# Patient Record
Sex: FEMALE | Race: WHITE | NOT HISPANIC OR LATINO | Employment: UNEMPLOYED | URBAN - METROPOLITAN AREA
[De-identification: names, ages, dates, MRNs, and addresses within clinical notes are randomized per-mention and may not be internally consistent; named-entity substitution may affect disease eponyms.]

---

## 2017-10-17 ENCOUNTER — OFFICE VISIT (OUTPATIENT)
Dept: OBGYN | Facility: CLINIC | Age: 32
End: 2017-10-17
Attending: MIDWIFE
Payer: COMMERCIAL

## 2017-10-17 VITALS
HEART RATE: 89 BPM | WEIGHT: 217.9 LBS | BODY MASS INDEX: 35.02 KG/M2 | HEIGHT: 66 IN | DIASTOLIC BLOOD PRESSURE: 77 MMHG | SYSTOLIC BLOOD PRESSURE: 109 MMHG

## 2017-10-17 DIAGNOSIS — Z00.00 VISIT FOR PREVENTIVE HEALTH EXAMINATION: Primary | ICD-10-CM

## 2017-10-17 PROCEDURE — 99212 OFFICE O/P EST SF 10 MIN: CPT | Mod: ZF

## 2017-10-17 RX ORDER — LORATADINE 10 MG/1
10 TABLET ORAL DAILY
COMMUNITY

## 2017-10-17 NOTE — PATIENT INSTRUCTIONS

## 2017-10-17 NOTE — MR AVS SNAPSHOT
After Visit Summary   10/17/2017    Marilyn Mi    MRN: [id number]           Patient Information     Date Of Birth          1985        Visit Information        Provider Department      10/17/2017 3:30 PM Snehal Garza APRN CNM [facility]        Today's Diagnoses     Visit for preventive health examination    -  1      Care Instructions      PREVENTIVE HEALTH RECOMMENDATIONS:   Most women need a yearly breast and pelvic exam.    A PAP screen, a test done DURING a pelvic exam, is NO longer recommended yearly.    March 2013, screening guidelines recommended by ACOG for PAP screen are:    1) First pap at age 21.    2) Pap every 3 years until age 30.    3) After age 30, pap every 3 years or Pap with HR HPV screen every 5 years until age 65.  4) Women do NOT need a vaginal Pap screen after a hysterectomy (surgical removal of the uterus) when they have not had cancer.    Exceptions:  1) Yearly pap if HIV+ or immunosuppressed secondary to organ transplant  2) STEPHANIE II-III continue routine screening for 20 years.    I encourage you continue looking for opportunities to choose a healthy lifestyle:       * Choose to eat a heart healthy diet. Check out the FOOD PLATE guidelines at: http://www.choosemyplate.gov/ for helpful hints on weight and cholesterol management.  Balance your caloric intake with exercise to maintain a BMI in the 22 to 26 range. For bone health: Eat calcium-rich foods like yogurt, broccoli or take chewable calcium pills (500 to 600 mg) twice a day with food.       * Exercise for at least an average of 30 minutes a day, 5 days of the week. This will help you control your weight, release stress, and help prevent disease.      * Take a Vitamin D3 supplement daily fall through spring and during summer unless you [id number]' full body sun exposure to skin without sunscreen.      * DO wear sunscreen to prevent skin cancer after the first 15-30 minutes.      * Identify  stressors in your life, find ways to release the stress, and, make time for yourself. PLEASE ask for help if mood changes last longer than two weeks.     * Limit alcohol to one drink per day.  No smoking.  Avoid second hand smoke. If you smoke, ask for help to stop.       *  If you are in a sexual relationship, talk with your partner about possible infection risks and take action to protect yourself from exposure to a sexual infection.    Please request an infection screen for STIs (sexually transmitted infections) if you are less than age 26 OR believe that you may be at risk.     Get a flu shot each year. Get a tetanus shot every 10 years. EVERYONE needs a pertussis (Whooping cough) booster.    See your dentist twice a year for an exam and preventive care cleaning.     Consider the following screen tests:    1) cholesterol test every 5 years.     2) yearly mammogram after age 40 unless you have identified risks.    3) colonoscopy every 10 years after age 50 unless you have identified risks.    4) diabetes blood test screening if you are at risk for diabetes.      Additional information that you may also find helpful:  The Internet now gives us access to LOTS of information -- some of it helpful, research documented and also plenty of harmful, anecdotal information that may not pertain to your situtaion. Consider visiting the following websites for accurate health information:    www.vitamindcouncil.org/ : Info and ongoing research re Vitamin D    www.fairview.org : Up to date and easily searchable information on multiple topics.    www.medlineplus.gov : medication info, interactive tutorials, watch real surgeries online    www.cdc.gov : public health info, travel advisories, epidemics (H1N1)    www.dandy/std.org: current research re diagnosis, treatment and prevention of sexually contacted infections.    www.health.state.[place] : [place] dept of heatl, public health issues in [place], N1N1    www.familydoctor.org : good info  from the Academy of Family Physicians        PREVENTIVE HEALTH RECOMMENDATIONS:   Most women need a yearly breast and pelvic exam.    A PAP screen, a test done DURING a pelvic exam, is NO longer recommended yearly.    March 2013, screening guidelines recommended by ACOG for PAP screen are:    1) First pap at age 21.    2) Pap every 3 years until age 30.    3) After age 30, pap every 3 years or Pap with HR HPV screen every 5 years until age 65.  4) Women do NOT need a vaginal Pap screen after a hysterectomy (surgical removal of the uterus) when they have not had cancer.    Exceptions:  1) Yearly pap if HIV+ or immunosuppressed secondary to organ transplant  2) STEPHANIE II-III continue routine screening for 20 years.    I encourage you continue looking for opportunities to choose a healthy lifestyle:       * Choose to eat a heart healthy diet. Check out the FOOD PLATE guidelines at: http://www.choosemyplate.gov/ for helpful hints on weight and cholesterol management.  Balance your caloric intake with exercise to maintain a BMI in the 22 to 26 range. For bone health: Eat calcium-rich foods like yogurt, broccoli or take chewable calcium pills (500 to 600 mg) twice a day with food.       * Exercise for at least an average of 30 minutes a day, 5 days of the week. This will help you control your weight, release stress, and help prevent disease.      * Take a Vitamin D3 supplement daily fall through spring and during summer unless you wfcw27-17' full body sun exposure to skin without sunscreen.      * DO wear sunscreen to prevent skin cancer after the first 15-30 minutes.      * Identify stressors in your life, find ways to release the stress, and, make time for yourself. PLEASE ask for help if mood changes last longer than two weeks.     * Limit alcohol to one drink per day.  No smoking.  Avoid second hand smoke. If you smoke, ask for help to stop.       *  If you are in a sexual relationship, talk with your partner about  possible infection risks and take action to protect yourself from exposure to a sexual infection.    Please request an infection screen for STIs (sexually transmitted infections) if you are less than age 26 OR believe that you may be at risk.     Get a flu shot each year. Get a tetanus shot every 10 years. EVERYONE needs a pertussis (Whooping cough) booster.    See your dentist twice a year for an exam and preventive care cleaning.     Consider the following screen tests:    1) cholesterol test every 5 years.     2) yearly mammogram after age 40 unless you have identified risks.    3) colonoscopy every 10 years after age 50 unless you have identified risks.    4) diabetes blood test screening if you are at risk for diabetes.      Additional information that you may also find helpful:  The Internet now gives us access to LOTS of information -- some of it helpful, research documented and also plenty of harmful, anecdotal information that may not pertain to your situtaion. Consider visiting the following websites for accurate health information:    www.vitamindcouncil.org/ : Info and ongoing research re Vitamin D    www.fairview.org : Up to date and easily searchable information on multiple topics.    www.medlineplus.gov : medication info, interactive tutorials, watch real surgeries online    www.cdc.gov : public health info, travel advisories, epidemics (H1N1)    www.dandy/std.org: current research re diagnosis, treatment and prevention of sexually contacted infections.    www.health.state.[place] : [place] dept of heatl, public health issues in [place], N1N1    www.familydoctor.org : good info from the Academy of Family Physicians                Follow-ups after your visit        Follow-up notes from your care team     Return in 1 year (on 10/17/2018) for Preventative Health Visit.      Who to contact     Please call your clinic at [phone] to:    Ask questions about your health    Make or cancel appointments    Discuss your  "medicines    Learn about your test results    Speak to your doctor   If you have compliments or concerns about an experience at your clinic, or if you wish to file a complaint, please contact [facility] Patient Relations at [phone] or email us at [email or web address]         Additional Information About Your Visit        [email or web address] Information     iProcuret is an electronic gateway that provides easy, online access to your medical records. With Beezik, you can request a clinic appointment, read your test results, renew a prescription or communicate with your care team.     To sign up for Beezik visit the website at [email or web address]   You will be asked to enter the access code listed below, as well as some personal information. Please follow the directions to create your username and password.     Your access code is: [email or web address]  Expires: 2018  6:31 AM     Your access code will  in 90 days. If you need help or a new code, please contact your [facility] or call [phone] for assistance.        Care EveryWhere ID     This is your Care EveryWhere ID. This could be used by other organizations to access your [place] medical records  [id number]        Your Vitals Were     Pulse Height BMI (Body Mass Index)             89 1.676 m (5' 6\") 35.17 kg/m2          Blood Pressure from Last 3 Encounters:   10/17/17 109/77   12 120/85   11 98/71    Weight from Last 3 Encounters:   10/17/17 98.8 kg (217 lb 14.4 oz)   11 89.3 kg (196 lb 12.8 oz)   11 77.1 kg (170 lb)              Today, you had the following     No orders found for display       Primary Care Provider    Physician No Ref-Primary       NO REF-PRIMARY PHYSICIAN        Equal Access to Services     ALEXA RAMIREZ : Hadii annabel Oconnor, waaxda luqadaha, qaybta kaalmada kaz, jyoti harris. So wac " [phone].    ATENCIÓN: Si ophelia jacobs, tiene a lynne disposición servicios gratuitos de asistencia lingüística. Kyree jimenez [phone].    We comply with applicable federal civil rights laws and [place] laws. We do not discriminate on the basis of race, color, national origin, age, disability, sex, sexual orientation, or gender identity.            Thank you!     Thank you for choosing [facility]  for your care. Our goal is always to provide you with excellent care. Hearing back from our patients is one way we can continue to improve our services. Please take a few minutes to complete the written survey that you may receive in the mail after your visit with us. Thank you!             Your Updated Medication List - Protect others around you: Learn how to safely use, store and throw away your medicines at www.disposemymeds.org.          This list is accurate as of: 10/17/17  7:00 PM.  Always use your most recent med list.                   Brand Name Dispense Instructions for use Diagnosis    ibuprofen 600 MG tablet    ADVIL/MOTRIN    20 tablet    Take 1 tablet by mouth every 8 hours as needed for pain.        loratadine 10 MG tablet    CLARITIN     Take 10 mg by mouth daily        VITAMIN D PO      Take 1 tablet by mouth daily.

## 2017-10-17 NOTE — PROGRESS NOTES
"    ROS      Progress Note    SUBJECTIVE:  Marilyn Mi is an 31 year old, , who requests an Annual Preventive Exam.       Concerns today include: pt is here for annual health check .  She has been under increased stress with frequent job relocations,hoping things will settle down with new move into housing.  Had been \"couch surfing \" with frequent moves.  Noting increased anxiety  Not interested in meds or therapy referral.  Will work on work life balance, get settled, plans to try lactase thinks some stomach upset due to lactose interance.      Menstrual History:  No flowsheet data found.    Last  No results found for: PAP  History of abnormal Pap smear: Status post benign hysterectomy. Health Maintenance and Surgical History updated.    Last No results found for: HPV16  Last No results found for: HPV18  Last No results found for: HRHPV    Mammogram current: not applicable  Last Mammogram:   No results found.     Last Colonoscopy:  No results found for this or any previous visit.      HISTORY:    Current Outpatient Prescriptions on File Prior to Visit:  Cholecalciferol (VITAMIN D PO) Take 1 tablet by mouth daily.   ibuprofen (ADVIL,MOTRIN) 600 MG tablet Take 1 tablet by mouth every 8 hours as needed for pain. (Patient not taking: Reported on 10/17/2017)     No current facility-administered medications on file prior to visit.   No Known Allergies    There is no immunization history on file for this patient.    Obstetric History       T0      L0     SAB0   TAB0   Ectopic0   Multiple0   Live Births0      Past Medical History:   Diagnosis Date     NO ACTIVE PROBLEMS      Ovarian cyst     age 13      Past Surgical History:   Procedure Laterality Date     GYN SURGERY      ovarian cyst/22 lbs - at age 13 yrs     HYSTERECTOMY      with above age 13     Family History   Problem Relation Age of Onset     Depression Mother      Alcoholism Mother      recovery      Endocrine Disease Father      " "hypothyroidsim     CANCER Maternal Grandmother      Colon/liver CA     DIABETES Paternal Grandfather      Endocrine Disease Brother      hyporthyroidism     Social History     Social History     Marital status:      Spouse name: N/A     Number of children: N/A     Years of education: N/A     Occupational History      - Finance      Social History Main Topics     Smoking status: Never Smoker     Smokeless tobacco: Never Used     Alcohol use 1.0 - 1.5 oz/week     2 - 3 drink(s) per week     Drug use: No     Sexual activity: Yes     Partners: Male     Birth control/ protection: None     Other Topics Concern      Service No     Blood Transfusions Yes     Caffeine Concern Yes     2 and Yerba Mate     Occupational Exposure No     Hobby Hazards Yes     Rock climbing, use proper gear     Sleep Concern Yes     sleeping through the night; To get drink of water     Stress Concern Yes     Weight Concern Yes     Special Diet No     Back Care No     Exercise No     Bike Helmet Yes     Seat Belt Yes     Social History Narrative    How much exercise per week? biking    How much calcium per day? none       How much caffeine per day? tea     How much vitamin D per day? none    Do you/your family wear seatbelts?  Yes    Do you/your family use safety helmets? Yes    Do you/your family use sunscreen? Yes    Do you/your family keep firearms in the home? No    Do you/your family have a smoke detector(s)? Yes        Edwar EISENBERG CMA 10/17/2017       ROS  [unfilled]  No flowsheet data found.    EXAM:  Blood pressure 109/77, pulse 89, height 1.676 m (5' 6\"), weight 98.8 kg (217 lb 14.4 oz). Body mass index is 35.17 kg/(m^2).  General - pleasant female in no acute distress.  Skin - no suspicious lesions or rashes  EENT-  PERRLA, euthyroid with out palpable nodules  Neck - supple without lymphadenopathy.  Lungs - clear to auscultation bilaterally.  Heart - regular rate and rhythm without murmur.  Abdomen - soft, " nontender, nondistended, no masses or organomegaly noted.  Musculoskeletal - no gross deformities.  Neurological - normal strength, sensation, and mental status.    Breast Exam:  Breast: Without visible skin changes. No dimpling or lesions seen.   Breasts supple, non-tender with palpation, no dominant mass, nodularity, or nipple discharge noted bilaterally. Axillary nodes negative.      Pelvic Exam:  EG/BUS: Normal genital architecture without lesions, erythema or abnormal secretions Bartholin's, Urethra, Stephen's normal   Urethral meatus: normal   Urethra: no masses, tenderness, or scarring   Bladder: no masses or tenderness   Vagina: moist, pink, rugae with creamy, white and odorless  secretions  Cervix: surgically absent  Uterus: surgically absent  Adnexa: No masses, nodularity, tenderness and pt is unsure if they left one ovary  One is Surgically absent  Rectum:anus normal       ASSESSMENT:  Encounter Diagnosis   Name Primary?     Visit for preventive health examination Yes        PLAN:   No orders of the defined types were placed in this encounter.    Orders Placed This Encounter   Medications     loratadine (CLARITIN) 10 MG tablet     Sig: Take 10 mg by mouth daily     Unable to locate surgical  Records from hysterectomy  requested at prior visit.  Discussed sending another request pt left without completing   Additional teaching done at this visit regarding stress reduction work life balance.    Referral prn to [facility] therapist  Offered H.Pylori stool test if decides  Declined lipids, TSH, vit D. hgb today   Declined flu shot     Return to clinic in one year.  Follow-up as needed.   Yola Garza CNM APRN

## 2017-10-17 NOTE — LETTER
"10/17/2017       RE: Marilyn Mi  [address]  [address]  [facility] [address]     Dear Colleague,    Thank you for referring your patient, Marilyn Mi, to the [facility] Please see a copy of my visit note below.        ROS      Progress Note    SUBJECTIVE:  Marilyn Mi is an 31 year old, , who requests an Annual Preventive Exam.       Concerns today include: pt is here for annual health check .  She has been under increased stress with frequent job relocations,hoping things will settle down with new move into housing.  Had been \"couch surfing \" with frequent moves.  Noting increased anxiety  Not interested in meds or therapy referral.  Will work on work life balance, get settled, plans to try lactase thinks some stomach upset due to lactose interance.      Menstrual History:  No flowsheet data found.    Last  No results found for: PAP  History of abnormal Pap smear: Status post benign hysterectomy. Health Maintenance and Surgical History updated.    Last No results found for: HPV16  Last No results found for: HPV18  Last No results found for: HRHPV    Mammogram current: not applicable  Last Mammogram:   No results found.     Last Colonoscopy:  No results found for this or any previous visit.      HISTORY:    Current Outpatient Prescriptions on File Prior to Visit:  Cholecalciferol (VITAMIN D PO) Take 1 tablet by mouth daily.   ibuprofen (ADVIL,MOTRIN) 600 MG tablet Take 1 tablet by mouth every 8 hours as needed for pain. (Patient not taking: Reported on 10/17/2017)     No current facility-administered medications on file prior to visit.   No Known Allergies    There is no immunization history on file for this patient.    Obstetric History       T0      L0     SAB0   TAB0   Ectopic0   Multiple0   Live Births0      Past Medical History:   Diagnosis Date     NO ACTIVE PROBLEMS      Ovarian cyst     age 13  " "    Past Surgical History:   Procedure Laterality Date     GYN SURGERY  1999    ovarian cyst/22 lbs - at age 13 yrs     HYSTERECTOMY      with above age 13     Family History   Problem Relation Age of Onset     Depression Mother      Alcoholism Mother      recovery      Endocrine Disease Father      hypothyroidsim     CANCER Maternal Grandmother      Colon/liver CA     DIABETES Paternal Grandfather      Endocrine Disease Brother      hyporthyroidism     Social History     Social History     Marital status:      Spouse name: N/A     Number of children: N/A     Years of education: N/A     Occupational History      - Finance      Social History Main Topics     Smoking status: Never Smoker     Smokeless tobacco: Never Used     Alcohol use 1.0 - 1.5 oz/week     2 - 3 drink(s) per week     Drug use: No     Sexual activity: Yes     Partners: Male     Birth control/ protection: None     Other Topics Concern      Service No     Blood Transfusions Yes     Caffeine Concern Yes     2 and Yerba Mate     Occupational Exposure No     Hobby Hazards Yes     Rock climbing, use proper gear     Sleep Concern Yes     sleeping through the night; To get drink of water     Stress Concern Yes     Weight Concern Yes     Special Diet No     Back Care No     Exercise No     Bike Helmet Yes     Seat Belt Yes     Social History Narrative    How much exercise per week? biking    How much calcium per day? none       How much caffeine per day? tea     How much vitamin D per day? none    Do you/your family wear seatbelts?  Yes    Do you/your family use safety helmets? Yes    Do you/your family use sunscreen? Yes    Do you/your family keep firearms in the home? No    Do you/your family have a smoke detector(s)? Yes        Edwar EISENBERG CMA 10/17/2017       ROS  [unfilled]  No flowsheet data found.    EXAM:  Blood pressure 109/77, pulse 89, height 1.676 m (5' 6\"), weight 98.8 kg (217 lb 14.4 oz). Body mass index is 35.17 " kg/(m^2).  General - pleasant female in no acute distress.  Skin - no suspicious lesions or rashes  EENT-  PERRLA, euthyroid with out palpable nodules  Neck - supple without lymphadenopathy.  Lungs - clear to auscultation bilaterally.  Heart - regular rate and rhythm without murmur.  Abdomen - soft, nontender, nondistended, no masses or organomegaly noted.  Musculoskeletal - no gross deformities.  Neurological - normal strength, sensation, and mental status.    Breast Exam:  Breast: Without visible skin changes. No dimpling or lesions seen.   Breasts supple, non-tender with palpation, no dominant mass, nodularity, or nipple discharge noted bilaterally. Axillary nodes negative.      Pelvic Exam:  EG/BUS: Normal genital architecture without lesions, erythema or abnormal secretions Bartholin's, Urethra, Lubeck's normal   Urethral meatus: normal   Urethra: no masses, tenderness, or scarring   Bladder: no masses or tenderness   Vagina: moist, pink, rugae with creamy, white and odorless  secretions  Cervix: surgically absent  Uterus: surgically absent  Adnexa: No masses, nodularity, tenderness and pt is unsure if they left one ovary  One is Surgically absent  Rectum:anus normal       ASSESSMENT:  Encounter Diagnosis   Name Primary?     Visit for preventive health examination Yes        PLAN:   No orders of the defined types were placed in this encounter.    Orders Placed This Encounter   Medications     loratadine (CLARITIN) 10 MG tablet     Sig: Take 10 mg by mouth daily     Unable to locate surgical  Records from hysterectomy  requested at prior visit.  Discussed sending another request pt left without completing   Additional teaching done at this visit regarding stress reduction work life balance.    Referral prn to [facility] therapist  Offered H.Pylori stool test if decides  Declined lipids, TSH, vit D. hgb today   Declined flu shot     Return to clinic in one year.  Follow-up as needed.   Yola Garza CNM APRN          Again, thank you for allowing me to participate in the care of your patient.      Sincerely,    GUCCI Alex CNM

## 2017-10-17 NOTE — LETTER
Date:October 19, 2017      Patient was self referred, no letter generated. Do not send.        [facility] Health Information

## 2023-09-25 ENCOUNTER — OFFICE VISIT (OUTPATIENT)
Dept: URGENT CARE | Facility: URGENT CARE | Age: 38
End: 2023-09-25
Payer: COMMERCIAL

## 2023-09-25 VITALS
SYSTOLIC BLOOD PRESSURE: 124 MMHG | HEART RATE: 82 BPM | WEIGHT: 227 LBS | BODY MASS INDEX: 36.64 KG/M2 | OXYGEN SATURATION: 96 % | DIASTOLIC BLOOD PRESSURE: 87 MMHG | RESPIRATION RATE: 18 BRPM | TEMPERATURE: 98 F

## 2023-09-25 DIAGNOSIS — J06.9 VIRAL URI WITH COUGH: Primary | ICD-10-CM

## 2023-09-25 PROCEDURE — 99203 OFFICE O/P NEW LOW 30 MIN: CPT | Performed by: NURSE PRACTITIONER

## 2023-09-25 RX ORDER — BENZONATATE 200 MG/1
200 CAPSULE ORAL 3 TIMES DAILY PRN
Qty: 30 CAPSULE | Refills: 0 | Status: SHIPPED | OUTPATIENT
Start: 2023-09-25 | End: 2023-10-05

## 2023-09-25 NOTE — PROGRESS NOTES
Chief Complaint   Patient presents with    Urgent Care     Cough, mild mucus mostly dry - SOB  for four weeks - Negative for COVID multiple times last one a week ago   Just came [place] from [place] of [place]          ICD-10-CM    1. Viral URI with cough  J06.9 benzonatate (TESSALON) 200 MG capsule      Keep hydrated with water.  Take benzonatate as needed for cough.  May also take Delsym cough syrup at bedtime as needed.  If she develops new fever or increasing shortness of breath she is instructed to follow-up with primary care or return to urgent care for recheck.      Subjective     Marilyn Mi is an 37 year old female who presents to clinic today for body aches, congestion for 2 weeks, then developed a really bad headache. She's had a cough the entire time.  Cough has remained well other symptoms have resolved.    ROS: 10 point ROS neg other than the symptoms noted above in the HPI.       Objective    /87   Pulse 82   Temp 98  F (36.7  C) (Tympanic)   Resp 18   Wt 103 kg (227 lb)   SpO2 96%   BMI 36.64 kg/m    Nurses notes and VS have been reviewed.    Physical Exam       GENERAL APPEARANCE: healthy appearing, alert     EYES: PERRL, EOMI, sclera non-icteric     HENT: oral exam benign, mucus membranes intact, without ulcers or lesions     NECK: no adenopathy or asymmetry, thyroid normal to palpation     RESP: lungs clear to auscultation - no rales, rhonchi or wheezes     CV: regular rates and rhythm, no murmurs, rubs, or gallop     MS: extremities normal- no gross deformities noted; normal muscle tone.     SKIN: no suspicious lesions or rashes       GUCCI English, CNP  [facility]    The use of Dragon/surespot dictation services may have been used to construct the content in this note; any grammatical or spelling errors are non-intentional. Please contact the author of this note directly if you are in need of any clarification.

## 2023-11-25 ENCOUNTER — HEALTH MAINTENANCE LETTER (OUTPATIENT)
Age: 38
End: 2023-11-25

## 2024-02-03 ENCOUNTER — HEALTH MAINTENANCE LETTER (OUTPATIENT)
Age: 39
End: 2024-02-03

## 2025-01-04 ENCOUNTER — HEALTH MAINTENANCE LETTER (OUTPATIENT)
Age: 40
End: 2025-01-04